# Patient Record
Sex: FEMALE | Race: WHITE | NOT HISPANIC OR LATINO | Employment: FULL TIME | URBAN - METROPOLITAN AREA
[De-identification: names, ages, dates, MRNs, and addresses within clinical notes are randomized per-mention and may not be internally consistent; named-entity substitution may affect disease eponyms.]

---

## 2018-04-23 ENCOUNTER — OFFICE VISIT (OUTPATIENT)
Dept: OBSTETRICS AND GYNECOLOGY | Facility: CLINIC | Age: 58
End: 2018-04-23
Payer: COMMERCIAL

## 2018-04-23 VITALS
SYSTOLIC BLOOD PRESSURE: 102 MMHG | HEIGHT: 60 IN | DIASTOLIC BLOOD PRESSURE: 80 MMHG | WEIGHT: 138.69 LBS | BODY MASS INDEX: 27.23 KG/M2

## 2018-04-23 DIAGNOSIS — Z01.419 ENCOUNTER FOR GYNECOLOGICAL EXAMINATION WITHOUT ABNORMAL FINDING: Primary | ICD-10-CM

## 2018-04-23 PROCEDURE — 88175 CYTOPATH C/V AUTO FLUID REDO: CPT

## 2018-04-23 PROCEDURE — 99386 PREV VISIT NEW AGE 40-64: CPT | Mod: S$GLB,,, | Performed by: OBSTETRICS & GYNECOLOGY

## 2018-04-23 PROCEDURE — 99999 PR PBB SHADOW E&M-NEW PATIENT-LVL III: CPT | Mod: PBBFAC,,, | Performed by: OBSTETRICS & GYNECOLOGY

## 2018-04-23 NOTE — PROGRESS NOTES
PT HERE FOR ANNUAL.  NO PROBLEMS.    ROS:  GENERAL: No fever, chills, fatigability or weight loss.  VULVAR: No pain, no lesions and no itching.  VAGINAL: No relaxation, no itching, no discharge, no abnormal bleeding and no lesions.  ABDOMEN: No abdominal pain. Denies nausea. Denies vomiting. No diarrhea. No constipation  BREAST: Denies pain. No lumps. No discharge.  URINARY: No incontinence, no nocturia, no frequency and no dysuria.  CARDIOVASCULAR: No chest pain. No shortness of breath. No leg cramps.  NEUROLOGICAL: No headaches. No vision changes.  The remainder of the review of systems was negative.    PE:  General Appearance: overweight And Well developed. Well nourished. In no acute distress.  Vulva: Lesions: No.  Urethral Meatus: Normal size. Normal location. No lesions. No prolapse.  Urethra: No masses. No tenderness. No prolapse. No scarring.  Bladder: No masses. No tenderness.  Vagina: Mucosa NI:yes discharge no, atrophy no, cystocele no or rectocele no.  Cervix: Lesion: no  Stenotic: no Cervical motion tenderness: no  Uterus: Uterus size: 6 weeks. Support good. Uterus size: Normal  Adnexa: Masses: No Tenderness: No CDS Nodularity: No  Abdomen: overweight No masses. No tenderness.  Breasts: No bilateral masses. No bilateral discharge. No bilateral tenderness. No bilateral fibrocystic changes.  Neck: No thyroid enlargement. No thyroid masses.  Skin: Rashes: No    PROCEDURES:    PLAN:     DIAGNOSIS:  1. Encounter for gynecological examination without abnormal finding        MEDICATIONS & ORDERS:  Orders Placed This Encounter    Mammo Digital Screening Bilat without CA    Liquid-based pap smear, screening     Patient was counseled today on the new ACS guidelines for cervical cytology screening as well as the current recommendations for breast cancer screening. She was counseled to follow up with her PCP for other routine health maintenance. Counseling session lasted approximately 10 minutes, and all her  questions were answered.     FOLLOW-UP: With me in 12 month

## 2018-04-27 ENCOUNTER — TELEPHONE (OUTPATIENT)
Dept: OBSTETRICS AND GYNECOLOGY | Facility: CLINIC | Age: 58
End: 2018-04-27

## 2018-04-27 DIAGNOSIS — R92.8 ABNORMAL MAMMOGRAM: Primary | ICD-10-CM

## 2018-04-27 NOTE — TELEPHONE ENCOUNTER
No answer. Left message.     Graciela this is Araceli calling from the [facility]. I was calling to to notify you of results from your recent visit at the clinic. Please call the office at your convenience.

## 2018-04-27 NOTE — TELEPHONE ENCOUNTER
----- Message from Jamir Mcadams Jr., MD sent at 4/27/2018  1:05 PM CDT -----  Negative for intraepithelial lesion or malignancy.

## 2018-04-27 NOTE — TELEPHONE ENCOUNTER
----- Message from Jamir Mcadams Jr., MD sent at 4/27/2018 11:38 AM CDT -----  CALL PT. NEEDS DX MMG AT [facility]

## 2018-04-30 ENCOUNTER — TELEPHONE (OUTPATIENT)
Dept: OBSTETRICS AND GYNECOLOGY | Facility: CLINIC | Age: 58
End: 2018-04-30

## 2018-04-30 NOTE — TELEPHONE ENCOUNTER
Spoke with pt, informed her of normal pap results and that dr dela cruz wants a diagnostic mammo done at [facility]. Pt verbalized understanding. appt scheduled for diagnostic mammo. No further questions or concerns noted.

## 2018-04-30 NOTE — TELEPHONE ENCOUNTER
----- Message from Krystina Gates LPN sent at 4/30/2018  8:50 AM CDT -----  Contact: WILLIE BRAVO [[id number]]      ----- Message -----  From: Norman Zendejas  Sent: 4/30/2018   8:36 AM  To: Abbe PATEL Jr Staff    Name of Who is Calling: WILLIE BRAVO [[id number]]      What is the request in detail: Returning a call      Can the clinic reply by [facility]: no      What Number to Call Back if not in [facility]: [phone]

## 2018-05-08 ENCOUNTER — HOSPITAL ENCOUNTER (OUTPATIENT)
Dept: RADIOLOGY | Facility: HOSPITAL | Age: 58
Discharge: HOME | End: 2018-05-08
Attending: OBSTETRICS & GYNECOLOGY
Payer: COMMERCIAL

## 2018-05-08 VITALS — WEIGHT: 138 LBS | HEIGHT: 60 IN | BODY MASS INDEX: 27.09 KG/M2

## 2018-05-08 DIAGNOSIS — R92.8 ABNORMAL MAMMOGRAM: ICD-10-CM

## 2018-05-08 PROCEDURE — 77065 DX MAMMO INCL CAD UNI: CPT | Mod: 26,,, | Performed by: RADIOLOGY

## 2018-05-08 PROCEDURE — 77065 DX MAMMO INCL CAD UNI: CPT | Mod: TC,PO

## 2018-05-08 PROCEDURE — 77061 BREAST TOMOSYNTHESIS UNI: CPT | Mod: 26,,, | Performed by: RADIOLOGY

## 2018-05-08 PROCEDURE — 77061 BREAST TOMOSYNTHESIS UNI: CPT | Mod: TC,PO

## 2019-04-12 ENCOUNTER — NURSE TRIAGE (OUTPATIENT)
Dept: ADMINISTRATIVE | Facility: CLINIC | Age: 59
End: 2019-04-12

## 2019-04-12 NOTE — TELEPHONE ENCOUNTER
Spoke with patient advised her that per Dr. Espinal he would like her to go to the ER since we can not do any proper testing that she would need in clinic. States she just doesn't understand why she needs to go to the ER. Advised patient that Dr. Espinal wants her to go to get some testing done as to why she is having face numbness and headaches. States she will go when she gets off of work at 3:15pm. Advised patient she should go to the ER now. Patient says she knows she is not having a stroke since she had an x-ray at the dentist yesterday and she will go to the ER when she gets off. Nothing further discussed

## 2019-04-12 NOTE — TELEPHONE ENCOUNTER
"    Reason for Disposition   New neurologic deficit that is present NOW, sudden onset of ANY of the following: * Weakness of the face, arm, or leg on one side of the body * Numbness of the face, arm, or leg on one side of the body * Loss of speech or garbled speech    Protocols used: NEUROLOGIC DEFICIT-A-OH    Annamarie states she woke 3 days ago with sudden numbness on one side of her face, still present.  She went to the dentist to see if it was a tooth, but received a thorough dental exam, nothing found, and DDS recommended she seek medical care.  She states she still has the numbness, it has not progressed, has not changed.  Additionally states that yesterday "I developed a raging severe headache".  Took ibuprofen; she said is better. Per [facility] tri[facility] protocol, recommended immediate medical attention.  She states the  made her an appointment with Dr Nathaniel Espinal, her pcp, for 1715 this evening.  Explained that these two symptoms are indications she needs to be seen now, evaluated, and not in the clinic at 5:15 on a Friday night.  No stat testing can be done in clinic.  She states she will call Dr Espinal.  Message to Dr Espinal. Please contact caller directly with any additional care advice.    "

## 2019-05-22 DIAGNOSIS — Z12.39 SCREENING BREAST EXAMINATION: Primary | ICD-10-CM

## 2019-06-03 ENCOUNTER — TELEPHONE (OUTPATIENT)
Dept: OBSTETRICS AND GYNECOLOGY | Facility: CLINIC | Age: 59
End: 2019-06-03

## 2019-06-03 ENCOUNTER — OFFICE VISIT (OUTPATIENT)
Dept: OBSTETRICS AND GYNECOLOGY | Facility: CLINIC | Age: 59
End: 2019-06-03
Payer: COMMERCIAL

## 2019-06-03 VITALS
BODY MASS INDEX: 27.77 KG/M2 | HEIGHT: 60 IN | DIASTOLIC BLOOD PRESSURE: 66 MMHG | WEIGHT: 141.44 LBS | SYSTOLIC BLOOD PRESSURE: 116 MMHG

## 2019-06-03 DIAGNOSIS — N83.202 CYST OF LEFT OVARY: Primary | ICD-10-CM

## 2019-06-03 PROCEDURE — 99396 PREV VISIT EST AGE 40-64: CPT | Mod: S$GLB,,, | Performed by: OBSTETRICS & GYNECOLOGY

## 2019-06-03 PROCEDURE — 99999 PR PBB SHADOW E&M-EST. PATIENT-LVL III: ICD-10-PCS | Mod: PBBFAC,,, | Performed by: OBSTETRICS & GYNECOLOGY

## 2019-06-03 PROCEDURE — 99999 PR PBB SHADOW E&M-EST. PATIENT-LVL III: CPT | Mod: PBBFAC,,, | Performed by: OBSTETRICS & GYNECOLOGY

## 2019-06-03 PROCEDURE — 99396 PR PREVENTIVE VISIT,EST,40-64: ICD-10-PCS | Mod: S$GLB,,, | Performed by: OBSTETRICS & GYNECOLOGY

## 2019-06-03 RX ORDER — IBUPROFEN 800 MG/1
TABLET ORAL
Refills: 0 | COMMUNITY
Start: 2019-05-16 | End: 2022-08-02

## 2019-06-03 NOTE — TELEPHONE ENCOUNTER
----- Message from Jordi Gutierrez MD sent at 6/3/2019  3:30 PM CDT -----  Imaging reviewed. Normal mammogram. Please notify patient.

## 2019-06-03 NOTE — PROGRESS NOTES
History & Physical  Gynecology      SUBJECTIVE:     Chief Complaint: Gynecologic Exam       History of Present Illness:  Ms. Jiménez is a 59 yr old female who presents for annual, well woman exam. Complaints: None. Postmenonpausal. Denies vaginal bleeding. No hx abnormal paps. Last pap was 2018. Currently sexually active. No hx of STIs. Denies fam hx of breast, ovarian or colon cancer. Feels safe at home, wears seatbelts, exercises intermittently.        Review of patient's allergies indicates:   Allergen Reactions    Azithromycin Anxiety    Codeine Nausea And Vomiting       History reviewed. No pertinent past medical history.  Past Surgical History:   Procedure Laterality Date    BREAST CYST EXCISION Left     BREAST MASS EXCISION Left 1996     SECTION      X 2    ESOPHAGEAL ATRESIA REPAIR  1965     OB History        4    Para   2    Term   2            AB   2    Living           SAB   2    TAB        Ectopic        Multiple        Live Births                   Family History   Problem Relation Age of Onset    Colon cancer Maternal Grandfather     Ovarian cancer Neg Hx     Breast cancer Neg Hx      Social History     Tobacco Use    Smoking status: Never Smoker   Substance Use Topics    Alcohol use: No    Drug use: No       Current Outpatient Medications   Medication Sig    ibuprofen (ADVIL,MOTRIN) 800 MG tablet      No current facility-administered medications for this visit.          Review of Systems:  Review of Systems   Constitutional: Negative for activity change, fatigue, fever and unexpected weight change.   Respiratory: Negative for cough and shortness of breath.    Cardiovascular: Negative for chest pain and palpitations.   Gastrointestinal: Negative for abdominal pain, constipation, diarrhea and nausea.   Endocrine: Negative for hot flashes.   Genitourinary: Negative for dyspareunia, dysuria, menorrhagia, menstrual problem, pelvic pain and vaginal discharge.    Musculoskeletal: Negative for back pain.   Integumentary:  Negative for nipple discharge.   Neurological: Negative for headaches.   Psychiatric/Behavioral: The patient is not nervous/anxious.    Breast: Negative for nipple discharge       OBJECTIVE:     Physical Exam:  Physical Exam   Constitutional: She is oriented to person, place, and time. She appears well-developed and well-nourished.   HENT:   Head: Normocephalic and atraumatic.   Neck: Normal range of motion. Neck supple.   Cardiovascular: Normal rate, regular rhythm, normal heart sounds and intact distal pulses.   Pulmonary/Chest: Effort normal and breath sounds normal. Right breast exhibits no inverted nipple, no mass, no nipple discharge, no skin change and no tenderness. Left breast exhibits no inverted nipple, no mass, no nipple discharge, no skin change and no tenderness.   Abdominal: Soft. Bowel sounds are normal. There is no tenderness. There is no guarding.   Genitourinary: There is no rash, tenderness, lesion or injury on the right labia. There is no rash, tenderness, lesion or injury on the left labia. Uterus is not deviated, not enlarged, not fixed and not tender. Cervix exhibits no motion tenderness, no discharge and no friability. Right adnexum displays no mass, no tenderness and no fullness. Left adnexum displays mass. Left adnexum displays no tenderness and no fullness. No erythema, tenderness or bleeding in the vagina. No foreign body in the vagina. No signs of injury around the vagina. No vaginal discharge found.   Neurological: She is alert and oriented to person, place, and time.   Skin: Skin is warm and dry.   Psychiatric: She has a normal mood and affect.   Vitals reviewed.        ASSESSMENT:       ICD-10-CM ICD-9-CM    1. Cyst of left ovary N83.202 620.2 US Pelvis Comp with Transvag NON-OB (xpd          Plan:      Annual, well woman done today. No hx of abnormal paps. Last pap 2018. Needs repeat pap in 2 years  Mammogram today  Has  never had colonoscopy. Discussed screening recommendations with patient. Patient currently declines  Some fullness of left adnexa. May be stool in bowel. Well circumscribed and mobile. Will evaluate with TVUS  RTC in 1 yr for annual exam or PRN    Counseling time: 15 minutes    Jordi Gtuierrez

## 2019-06-18 ENCOUNTER — TELEPHONE (OUTPATIENT)
Dept: OBSTETRICS AND GYNECOLOGY | Facility: CLINIC | Age: 59
End: 2019-06-18

## 2019-06-18 NOTE — TELEPHONE ENCOUNTER
----- Message from Nabila Hagen MA sent at 6/18/2019  2:30 PM CDT -----  Contact: Self  Pt is calling to get ultrasound results.  The pt can be reached at [phone].  Thanks FL

## 2020-07-16 ENCOUNTER — OFFICE VISIT (OUTPATIENT)
Dept: OBSTETRICS AND GYNECOLOGY | Facility: CLINIC | Age: 60
End: 2020-07-16
Payer: COMMERCIAL

## 2020-07-16 VITALS
BODY MASS INDEX: 27.36 KG/M2 | DIASTOLIC BLOOD PRESSURE: 70 MMHG | WEIGHT: 140.13 LBS | SYSTOLIC BLOOD PRESSURE: 124 MMHG

## 2020-07-16 DIAGNOSIS — Z12.31 ENCOUNTER FOR SCREENING MAMMOGRAM FOR MALIGNANT NEOPLASM OF BREAST: ICD-10-CM

## 2020-07-16 DIAGNOSIS — Z01.419 ENCOUNTER FOR WELL WOMAN EXAM WITH ROUTINE GYNECOLOGICAL EXAM: Primary | ICD-10-CM

## 2020-07-16 DIAGNOSIS — Z12.11 SCREENING FOR COLON CANCER: ICD-10-CM

## 2020-07-16 PROCEDURE — 3008F PR BODY MASS INDEX (BMI) DOCUMENTED: ICD-10-PCS | Mod: CPTII,S$GLB,, | Performed by: NURSE PRACTITIONER

## 2020-07-16 PROCEDURE — 99999 PR PBB SHADOW E&M-EST. PATIENT-LVL III: ICD-10-PCS | Mod: PBBFAC,,, | Performed by: NURSE PRACTITIONER

## 2020-07-16 PROCEDURE — 3008F BODY MASS INDEX DOCD: CPT | Mod: CPTII,S$GLB,, | Performed by: NURSE PRACTITIONER

## 2020-07-16 PROCEDURE — 99396 PR PREVENTIVE VISIT,EST,40-64: ICD-10-PCS | Mod: S$GLB,,, | Performed by: NURSE PRACTITIONER

## 2020-07-16 PROCEDURE — 99999 PR PBB SHADOW E&M-EST. PATIENT-LVL III: CPT | Mod: PBBFAC,,, | Performed by: NURSE PRACTITIONER

## 2020-07-16 PROCEDURE — 99396 PREV VISIT EST AGE 40-64: CPT | Mod: S$GLB,,, | Performed by: NURSE PRACTITIONER

## 2020-07-16 NOTE — PROGRESS NOTES
Chief Complaint: Well Woman Exam     HPI:      Annamarie Jiménez is a 60 y.o.  who presents today for well woman exam. Patient is postmenopausal for 18 years. Denies PMB. Patient denies abnormal vaginal bleeding, discharge, pelvic pain, urinary problems, or changes in appetite. Ms. Jiménez is currently sexually active with a single male partner. She declines STD screening today. Ms. Jiménez confirms that she is safe at home.    Previous Pap:  NILM (2018) Denies hx of abnormal paps  Previous Mammogram: BiRads: 1 T-C Score: 8.06% (2019)   Most Recent Dexa: not indicated  Colonoscopy: Never had.     Family History   Problem Relation Age of Onset    Colon cancer Maternal Grandfather     Ovarian cancer Neg Hx     Breast cancer Neg Hx      OB History        4    Para   2    Term   2            AB   2    Living           SAB   2    TAB        Ectopic        Multiple        Live Births                     ROS:     GENERAL: Denies unintentional weight gain or weight loss. Feeling well overall.   SKIN: Denies rash or lesions.   HEENT: Denies headaches, or vision changes.   CARDIOVASCULAR: Denies palpitations or chest pain.   RESPIRATORY: Denies shortness of breath or dyspnea on exertion.  BREASTS: Denies pain, lumps, or nipple discharge.   ABDOMEN: Denies abdominal pain, constipation, diarrhea, nausea, vomiting, change in appetite.  URINARY: Denies frequency, dysuria, hematuria.  NEUROLOGIC: Denies syncope or weakness.   PSYCHIATRIC: Denies depression, anxiety or mood swings.    Physical Exam:      PHYSICAL EXAM:  /70   Wt 63.6 kg (140 lb 1.6 oz)   BMI 27.36 kg/m²   Body mass index is 27.36 kg/m².     APPEARANCE: Well nourished, well developed, in no acute distress.  PSYCH: Appropriate mood and affect.  SKIN: No acne or hirsutism  NECK: Neck symmetric without masses or thyromegaly  NODES: No inguinal, axillary, or supraclavicular lymph node enlargement  ABDOMEN: Soft.  No tenderness  or masses.    CARDIOVASCULAR: No edema of peripheral extremities  BREASTS: Symmetrical, no skin changes or visible lesions.  No palpable masses or nipple discharge bilaterally.  PELVIC: Normal external genitalia without lesions.  Normal hair distribution.  Adequate perineal body, normal urethral meatus.  Vagina atrophic without lesions or discharge.  Cervix pink, without lesions, discharge or tenderness.  No significant cystocele or rectocele.  Bimanual exam shows uterus to be normal size, regular, mobile and nontender.  Adnexa without masses or tenderness.      Assessment/Plan:     Encounter for well woman exam with routine gynecological exam    Encounter for screening mammogram for malignant neoplasm of breast  -     Mammo Digital Screening Bilat w/ Ho; Future; Expected date: 07/16/2020    Screening for colon cancer      Counseling:     Patient was counseled today on current ASCCP pap guidelines, the recommendation for yearly pelvic exams, healthy diet and exercise routines, breast self awareness and annual mammograms.She is to see her PCP for other health maintenance.     Use of the [facility] Patient Portal discussed and encouraged during today's visit.

## 2020-07-29 ENCOUNTER — HOSPITAL ENCOUNTER (OUTPATIENT)
Dept: RADIOLOGY | Facility: OTHER | Age: 60
Discharge: HOME | End: 2020-07-29
Attending: NURSE PRACTITIONER
Payer: COMMERCIAL

## 2020-07-29 DIAGNOSIS — Z12.31 ENCOUNTER FOR SCREENING MAMMOGRAM FOR MALIGNANT NEOPLASM OF BREAST: ICD-10-CM

## 2020-07-29 PROCEDURE — 77063 BREAST TOMOSYNTHESIS BI: CPT | Mod: 26,,, | Performed by: RADIOLOGY

## 2020-07-29 PROCEDURE — 77063 MAMMO DIGITAL SCREENING BILAT WITH TOMOSYNTHESIS_CAD: ICD-10-PCS | Mod: 26,,, | Performed by: RADIOLOGY

## 2020-07-29 PROCEDURE — 77067 MAMMO DIGITAL SCREENING BILAT WITH TOMOSYNTHESIS_CAD: ICD-10-PCS | Mod: 26,,, | Performed by: RADIOLOGY

## 2020-07-29 PROCEDURE — 77067 SCR MAMMO BI INCL CAD: CPT | Mod: 26,,, | Performed by: RADIOLOGY

## 2020-07-29 PROCEDURE — 77067 SCR MAMMO BI INCL CAD: CPT | Mod: TC

## 2020-08-03 ENCOUNTER — TELEPHONE (OUTPATIENT)
Dept: OBSTETRICS AND GYNECOLOGY | Facility: CLINIC | Age: 60
End: 2020-08-03

## 2021-01-14 ENCOUNTER — IMMUNIZATION (OUTPATIENT)
Dept: PRIMARY CARE CLINIC | Facility: CLINIC | Age: 61
End: 2021-01-14
Payer: COMMERCIAL

## 2021-01-14 DIAGNOSIS — Z23 NEED FOR VACCINATION: ICD-10-CM

## 2021-01-14 PROCEDURE — 91300 COVID-19, MRNA, LNP-S, PF, 30 MCG/0.3 ML DOSE VACCINE: CPT | Mod: PBBFAC | Performed by: EMERGENCY MEDICINE

## 2021-02-04 ENCOUNTER — IMMUNIZATION (OUTPATIENT)
Dept: PRIMARY CARE CLINIC | Facility: CLINIC | Age: 61
End: 2021-02-04
Payer: COMMERCIAL

## 2021-02-04 DIAGNOSIS — Z23 NEED FOR VACCINATION: Primary | ICD-10-CM

## 2021-02-04 PROCEDURE — 91300 COVID-19, MRNA, LNP-S, PF, 30 MCG/0.3 ML DOSE VACCINE: CPT | Mod: PBBFAC | Performed by: EMERGENCY MEDICINE

## 2021-02-04 PROCEDURE — 0002A COVID-19, MRNA, LNP-S, PF, 30 MCG/0.3 ML DOSE VACCINE: CPT | Mod: PBBFAC | Performed by: EMERGENCY MEDICINE

## 2021-08-19 ENCOUNTER — OFFICE VISIT (OUTPATIENT)
Dept: OBSTETRICS AND GYNECOLOGY | Facility: CLINIC | Age: 61
End: 2021-08-19
Payer: COMMERCIAL

## 2021-08-19 VITALS
WEIGHT: 142.75 LBS | DIASTOLIC BLOOD PRESSURE: 68 MMHG | SYSTOLIC BLOOD PRESSURE: 122 MMHG | BODY MASS INDEX: 27.88 KG/M2

## 2021-08-19 DIAGNOSIS — Z01.419 ENCOUNTER FOR WELL WOMAN EXAM WITH ROUTINE GYNECOLOGICAL EXAM: Primary | ICD-10-CM

## 2021-08-19 DIAGNOSIS — Z12.31 ENCOUNTER FOR SCREENING MAMMOGRAM FOR MALIGNANT NEOPLASM OF BREAST: ICD-10-CM

## 2021-08-19 PROCEDURE — 1160F PR REVIEW ALL MEDS BY PRESCRIBER/CLIN PHARMACIST DOCUMENTED: ICD-10-PCS | Mod: CPTII,S$GLB,, | Performed by: NURSE PRACTITIONER

## 2021-08-19 PROCEDURE — 88175 CYTOPATH C/V AUTO FLUID REDO: CPT | Performed by: NURSE PRACTITIONER

## 2021-08-19 PROCEDURE — 3008F BODY MASS INDEX DOCD: CPT | Mod: CPTII,S$GLB,, | Performed by: NURSE PRACTITIONER

## 2021-08-19 PROCEDURE — 3078F PR MOST RECENT DIASTOLIC BLOOD PRESSURE < 80 MM HG: ICD-10-PCS | Mod: CPTII,S$GLB,, | Performed by: NURSE PRACTITIONER

## 2021-08-19 PROCEDURE — 3078F DIAST BP <80 MM HG: CPT | Mod: CPTII,S$GLB,, | Performed by: NURSE PRACTITIONER

## 2021-08-19 PROCEDURE — 1159F MED LIST DOCD IN RCRD: CPT | Mod: CPTII,S$GLB,, | Performed by: NURSE PRACTITIONER

## 2021-08-19 PROCEDURE — 99999 PR PBB SHADOW E&M-EST. PATIENT-LVL III: ICD-10-PCS | Mod: PBBFAC,,, | Performed by: NURSE PRACTITIONER

## 2021-08-19 PROCEDURE — 99396 PR PREVENTIVE VISIT,EST,40-64: ICD-10-PCS | Mod: S$GLB,,, | Performed by: NURSE PRACTITIONER

## 2021-08-19 PROCEDURE — 87624 HPV HI-RISK TYP POOLED RSLT: CPT | Performed by: NURSE PRACTITIONER

## 2021-08-19 PROCEDURE — 1159F PR MEDICATION LIST DOCUMENTED IN MEDICAL RECORD: ICD-10-PCS | Mod: CPTII,S$GLB,, | Performed by: NURSE PRACTITIONER

## 2021-08-19 PROCEDURE — 3074F PR MOST RECENT SYSTOLIC BLOOD PRESSURE < 130 MM HG: ICD-10-PCS | Mod: CPTII,S$GLB,, | Performed by: NURSE PRACTITIONER

## 2021-08-19 PROCEDURE — 1126F PR PAIN SEVERITY QUANTIFIED, NO PAIN PRESENT: ICD-10-PCS | Mod: CPTII,S$GLB,, | Performed by: NURSE PRACTITIONER

## 2021-08-19 PROCEDURE — 3074F SYST BP LT 130 MM HG: CPT | Mod: CPTII,S$GLB,, | Performed by: NURSE PRACTITIONER

## 2021-08-19 PROCEDURE — 1160F RVW MEDS BY RX/DR IN RCRD: CPT | Mod: CPTII,S$GLB,, | Performed by: NURSE PRACTITIONER

## 2021-08-19 PROCEDURE — 99999 PR PBB SHADOW E&M-EST. PATIENT-LVL III: CPT | Mod: PBBFAC,,, | Performed by: NURSE PRACTITIONER

## 2021-08-19 PROCEDURE — 1126F AMNT PAIN NOTED NONE PRSNT: CPT | Mod: CPTII,S$GLB,, | Performed by: NURSE PRACTITIONER

## 2021-08-19 PROCEDURE — 3008F PR BODY MASS INDEX (BMI) DOCUMENTED: ICD-10-PCS | Mod: CPTII,S$GLB,, | Performed by: NURSE PRACTITIONER

## 2021-08-19 PROCEDURE — 99396 PREV VISIT EST AGE 40-64: CPT | Mod: S$GLB,,, | Performed by: NURSE PRACTITIONER

## 2021-08-20 ENCOUNTER — HOSPITAL ENCOUNTER (OUTPATIENT)
Dept: RADIOLOGY | Facility: OTHER | Age: 61
Discharge: HOME | End: 2021-08-20
Attending: NURSE PRACTITIONER
Payer: COMMERCIAL

## 2021-08-20 DIAGNOSIS — Z12.31 ENCOUNTER FOR SCREENING MAMMOGRAM FOR MALIGNANT NEOPLASM OF BREAST: ICD-10-CM

## 2021-08-20 PROCEDURE — 77067 SCR MAMMO BI INCL CAD: CPT | Mod: 26,,, | Performed by: RADIOLOGY

## 2021-08-20 PROCEDURE — 77063 BREAST TOMOSYNTHESIS BI: CPT | Mod: 26,,, | Performed by: RADIOLOGY

## 2021-08-20 PROCEDURE — 77067 SCR MAMMO BI INCL CAD: CPT | Mod: TC

## 2021-08-20 PROCEDURE — 77063 MAMMO DIGITAL SCREENING BILAT WITH TOMO: ICD-10-PCS | Mod: 26,,, | Performed by: RADIOLOGY

## 2021-08-20 PROCEDURE — 77067 MAMMO DIGITAL SCREENING BILAT WITH TOMO: ICD-10-PCS | Mod: 26,,, | Performed by: RADIOLOGY

## 2021-08-24 ENCOUNTER — TELEPHONE (OUTPATIENT)
Dept: OBSTETRICS AND GYNECOLOGY | Facility: CLINIC | Age: 61
End: 2021-08-24

## 2021-08-26 LAB
HPV HR 12 DNA SPEC QL NAA+PROBE: NEGATIVE
HPV16 AG SPEC QL: NEGATIVE
HPV18 DNA SPEC QL NAA+PROBE: NEGATIVE

## 2021-08-27 ENCOUNTER — TELEPHONE (OUTPATIENT)
Dept: OBSTETRICS AND GYNECOLOGY | Facility: CLINIC | Age: 61
End: 2021-08-27

## 2021-09-10 LAB
FINAL PATHOLOGIC DIAGNOSIS: NORMAL
Lab: NORMAL

## 2021-09-14 ENCOUNTER — TELEPHONE (OUTPATIENT)
Dept: OBSTETRICS AND GYNECOLOGY | Facility: CLINIC | Age: 61
End: 2021-09-14

## 2022-07-29 ENCOUNTER — PATIENT OUTREACH (OUTPATIENT)
Dept: ADMINISTRATIVE | Facility: HOSPITAL | Age: 62
End: 2022-07-29
Payer: COMMERCIAL

## 2022-08-02 ENCOUNTER — OFFICE VISIT (OUTPATIENT)
Dept: PRIMARY CARE CLINIC | Facility: CLINIC | Age: 62
End: 2022-08-02
Payer: COMMERCIAL

## 2022-08-02 VITALS
BODY MASS INDEX: 27.83 KG/M2 | HEART RATE: 68 BPM | SYSTOLIC BLOOD PRESSURE: 118 MMHG | OXYGEN SATURATION: 97 % | RESPIRATION RATE: 18 BRPM | WEIGHT: 141.75 LBS | HEIGHT: 60 IN | DIASTOLIC BLOOD PRESSURE: 72 MMHG

## 2022-08-02 DIAGNOSIS — Z11.4 ENCOUNTER FOR SCREENING FOR HIV: ICD-10-CM

## 2022-08-02 DIAGNOSIS — M15.1 OSTEOARTHRITIS OF DISTAL INTERPHALANGEAL (DIP) JOINT OF LEFT INDEX FINGER: ICD-10-CM

## 2022-08-02 DIAGNOSIS — Z00.00 HEALTH CARE MAINTENANCE: ICD-10-CM

## 2022-08-02 DIAGNOSIS — Z11.59 NEED FOR HEPATITIS C SCREENING TEST: ICD-10-CM

## 2022-08-02 DIAGNOSIS — J45.20 MILD INTERMITTENT ASTHMA WITHOUT COMPLICATION: ICD-10-CM

## 2022-08-02 DIAGNOSIS — Z87.01 HISTORY OF BACTERIAL PNEUMONIA: ICD-10-CM

## 2022-08-02 DIAGNOSIS — J40 BRONCHITIS: ICD-10-CM

## 2022-08-02 DIAGNOSIS — Z12.11 SPECIAL SCREENING FOR MALIGNANT NEOPLASM OF COLON: Primary | ICD-10-CM

## 2022-08-02 DIAGNOSIS — Q39.0 ESOPHAGEAL ATRESIA: ICD-10-CM

## 2022-08-02 PROCEDURE — 3074F PR MOST RECENT SYSTOLIC BLOOD PRESSURE < 130 MM HG: ICD-10-PCS | Mod: CPTII,S$GLB,, | Performed by: FAMILY MEDICINE

## 2022-08-02 PROCEDURE — 3074F SYST BP LT 130 MM HG: CPT | Mod: CPTII,S$GLB,, | Performed by: FAMILY MEDICINE

## 2022-08-02 PROCEDURE — 1159F MED LIST DOCD IN RCRD: CPT | Mod: CPTII,S$GLB,, | Performed by: FAMILY MEDICINE

## 2022-08-02 PROCEDURE — 3078F DIAST BP <80 MM HG: CPT | Mod: CPTII,S$GLB,, | Performed by: FAMILY MEDICINE

## 2022-08-02 PROCEDURE — 99999 PR PBB SHADOW E&M-EST. PATIENT-LVL III: ICD-10-PCS | Mod: PBBFAC,,, | Performed by: FAMILY MEDICINE

## 2022-08-02 PROCEDURE — 1159F PR MEDICATION LIST DOCUMENTED IN MEDICAL RECORD: ICD-10-PCS | Mod: CPTII,S$GLB,, | Performed by: FAMILY MEDICINE

## 2022-08-02 PROCEDURE — 99999 PR PBB SHADOW E&M-EST. PATIENT-LVL III: CPT | Mod: PBBFAC,,, | Performed by: FAMILY MEDICINE

## 2022-08-02 PROCEDURE — 99214 PR OFFICE/OUTPT VISIT, EST, LEVL IV, 30-39 MIN: ICD-10-PCS | Mod: S$GLB,,, | Performed by: FAMILY MEDICINE

## 2022-08-02 PROCEDURE — 3078F PR MOST RECENT DIASTOLIC BLOOD PRESSURE < 80 MM HG: ICD-10-PCS | Mod: CPTII,S$GLB,, | Performed by: FAMILY MEDICINE

## 2022-08-02 PROCEDURE — 99214 OFFICE O/P EST MOD 30 MIN: CPT | Mod: S$GLB,,, | Performed by: FAMILY MEDICINE

## 2022-08-02 PROCEDURE — 3008F PR BODY MASS INDEX (BMI) DOCUMENTED: ICD-10-PCS | Mod: CPTII,S$GLB,, | Performed by: FAMILY MEDICINE

## 2022-08-02 PROCEDURE — 3008F BODY MASS INDEX DOCD: CPT | Mod: CPTII,S$GLB,, | Performed by: FAMILY MEDICINE

## 2022-08-02 RX ORDER — LORAZEPAM 1 MG/1
TABLET ORAL
Qty: 30 TABLET | Refills: 2 | Status: SHIPPED | OUTPATIENT
Start: 2022-08-02

## 2022-08-02 RX ORDER — SERTRALINE HYDROCHLORIDE 25 MG/1
25 TABLET, FILM COATED ORAL DAILY
Qty: 30 TABLET | Refills: 11 | Status: SHIPPED | OUTPATIENT
Start: 2022-08-02 | End: 2023-09-05

## 2022-08-02 RX ORDER — CEFDINIR 300 MG/1
300 CAPSULE ORAL 2 TIMES DAILY
Qty: 20 CAPSULE | Refills: 0 | Status: SHIPPED | OUTPATIENT
Start: 2022-08-02 | End: 2022-08-12
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
## 2022-10-07 ENCOUNTER — PATIENT MESSAGE (OUTPATIENT)
Dept: ADMINISTRATIVE | Facility: HOSPITAL | Age: 62
End: 2022-10-07
Payer: COMMERCIAL

## 2022-10-11 LAB — NONINV COLON CA DNA+OCC BLD SCRN STL QL: NEGATIVE

## 2022-11-10 ENCOUNTER — OFFICE VISIT (OUTPATIENT)
Dept: OBSTETRICS AND GYNECOLOGY | Facility: CLINIC | Age: 62
End: 2022-11-10
Payer: COMMERCIAL

## 2022-11-10 VITALS
SYSTOLIC BLOOD PRESSURE: 122 MMHG | BODY MASS INDEX: 26.97 KG/M2 | DIASTOLIC BLOOD PRESSURE: 76 MMHG | WEIGHT: 138.13 LBS

## 2022-11-10 DIAGNOSIS — Z12.31 ENCOUNTER FOR SCREENING MAMMOGRAM FOR MALIGNANT NEOPLASM OF BREAST: ICD-10-CM

## 2022-11-10 DIAGNOSIS — Z01.419 ENCOUNTER FOR WELL WOMAN EXAM WITH ROUTINE GYNECOLOGICAL EXAM: Primary | ICD-10-CM

## 2022-11-10 PROCEDURE — 3008F PR BODY MASS INDEX (BMI) DOCUMENTED: ICD-10-PCS | Mod: CPTII,S$GLB,, | Performed by: NURSE PRACTITIONER

## 2022-11-10 PROCEDURE — 1159F PR MEDICATION LIST DOCUMENTED IN MEDICAL RECORD: ICD-10-PCS | Mod: CPTII,S$GLB,, | Performed by: NURSE PRACTITIONER

## 2022-11-10 PROCEDURE — 1160F RVW MEDS BY RX/DR IN RCRD: CPT | Mod: CPTII,S$GLB,, | Performed by: NURSE PRACTITIONER

## 2022-11-10 PROCEDURE — 3074F SYST BP LT 130 MM HG: CPT | Mod: CPTII,S$GLB,, | Performed by: NURSE PRACTITIONER

## 2022-11-10 PROCEDURE — 3078F PR MOST RECENT DIASTOLIC BLOOD PRESSURE < 80 MM HG: ICD-10-PCS | Mod: CPTII,S$GLB,, | Performed by: NURSE PRACTITIONER

## 2022-11-10 PROCEDURE — 3074F PR MOST RECENT SYSTOLIC BLOOD PRESSURE < 130 MM HG: ICD-10-PCS | Mod: CPTII,S$GLB,, | Performed by: NURSE PRACTITIONER

## 2022-11-10 PROCEDURE — 1159F MED LIST DOCD IN RCRD: CPT | Mod: CPTII,S$GLB,, | Performed by: NURSE PRACTITIONER

## 2022-11-10 PROCEDURE — 3008F BODY MASS INDEX DOCD: CPT | Mod: CPTII,S$GLB,, | Performed by: NURSE PRACTITIONER

## 2022-11-10 PROCEDURE — 99396 PREV VISIT EST AGE 40-64: CPT | Mod: S$GLB,,, | Performed by: NURSE PRACTITIONER

## 2022-11-10 PROCEDURE — 99999 PR PBB SHADOW E&M-EST. PATIENT-LVL III: ICD-10-PCS | Mod: PBBFAC,,, | Performed by: NURSE PRACTITIONER

## 2022-11-10 PROCEDURE — 3078F DIAST BP <80 MM HG: CPT | Mod: CPTII,S$GLB,, | Performed by: NURSE PRACTITIONER

## 2022-11-10 PROCEDURE — 99396 PR PREVENTIVE VISIT,EST,40-64: ICD-10-PCS | Mod: S$GLB,,, | Performed by: NURSE PRACTITIONER

## 2022-11-10 PROCEDURE — 99999 PR PBB SHADOW E&M-EST. PATIENT-LVL III: CPT | Mod: PBBFAC,,, | Performed by: NURSE PRACTITIONER

## 2022-11-10 PROCEDURE — 1160F PR REVIEW ALL MEDS BY PRESCRIBER/CLIN PHARMACIST DOCUMENTED: ICD-10-PCS | Mod: CPTII,S$GLB,, | Performed by: NURSE PRACTITIONER

## 2022-11-10 NOTE — PROGRESS NOTES
Chief Complaint: Well Woman Exam     HPI:      Annamarie Jiménez is a 62 y.o.  who presents today for well woman exam. Patient is postmenopausal. Denies PMB. Patient denies abnormal vaginal bleeding, discharge, pelvic pain, urinary problems, or changes in appetite. Ms. Jiménez is currently sexually active with a single male partner. She declines STD screening today. Ms. Jiménez confirms that she is safe at home.    Previous Pap:  NILM/ HPV neg (2021) Denies hx of abnormal paps  Previous Mammogram: BiRads: 1 T-C Score: 6.01% (2020)   Colonoscopy: cologuard -negative (10/2022)    Family History   Problem Relation Age of Onset    Colon cancer Maternal Grandfather     Ovarian cancer Neg Hx     Breast cancer Neg Hx      OB History          4    Para   2    Term   2            AB   2    Living             SAB   2    IAB        Ectopic        Multiple        Live Births                     ROS:     GENERAL: Denies unintentional weight gain or weight loss. Feeling well overall.   SKIN: Denies rash or lesions.   BREASTS: Denies pain, lumps, or nipple discharge.   ABDOMEN: Denies abdominal pain, constipation, diarrhea, nausea, vomiting, change in appetite.  URINARY: Denies frequency, dysuria, hematuria.  PSYCHIATRIC: Denies depression, anxiety or mood swings.    Physical Exam:      PHYSICAL EXAM:  /76   Wt 62.6 kg (138 lb 1.9 oz)   BMI 26.97 kg/m²   Body mass index is 26.97 kg/m².     APPEARANCE: Well nourished, well developed, in no acute distress.  PSYCH: Appropriate mood and affect.  ABDOMEN: Soft.  No tenderness or masses.    BREASTS: Symmetrical, no skin changes or visible lesions.  No palpable masses or nipple discharge bilaterally.  PELVIC: Normal external genitalia without lesions.  Normal hair distribution.  Adequate perineal body, normal urethral meatus.  Vagina atrophic without lesions or discharge.  Cervix pink, without lesions, discharge or tenderness.  No significant  cystocele or rectocele.  Bimanual exam shows uterus to be slightly enlarged size, irregular, mobile and nontender.  Adnexa without masses or tenderness.      Assessment/Plan:     Encounter for well woman exam with routine gynecological exam    Encounter for screening mammogram for malignant neoplasm of breast  -     Mammo Digital Screening Bilat w/ Ho; Future; Expected date: 11/10/2022    Counseling:     Patient was counseled today on current ASCCP pap guidelines, the recommendation for yearly pelvic exams, healthy diet and exercise routines, breast self awareness and annual mammograms.She is to see her PCP for other health maintenance.     Use of the [facility] Patient Portal discussed and encouraged during today's visit.

## 2022-11-22 ENCOUNTER — HOSPITAL ENCOUNTER (OUTPATIENT)
Dept: RADIOLOGY | Facility: CLINIC | Age: 62
Discharge: HOME | End: 2022-11-22
Attending: NURSE PRACTITIONER
Payer: COMMERCIAL

## 2022-11-22 DIAGNOSIS — Z12.31 ENCOUNTER FOR SCREENING MAMMOGRAM FOR MALIGNANT NEOPLASM OF BREAST: ICD-10-CM

## 2022-11-22 PROCEDURE — 77067 SCR MAMMO BI INCL CAD: CPT | Mod: TC,PO

## 2022-11-22 PROCEDURE — 77063 BREAST TOMOSYNTHESIS BI: CPT | Mod: TC,PO

## 2022-11-22 PROCEDURE — 77063 BREAST TOMOSYNTHESIS BI: CPT | Mod: 26,,, | Performed by: RADIOLOGY

## 2022-11-22 PROCEDURE — 77067 SCR MAMMO BI INCL CAD: CPT | Mod: 26,,, | Performed by: RADIOLOGY

## 2022-11-22 PROCEDURE — 77067 MAMMO DIGITAL SCREENING BILAT WITH TOMO: ICD-10-PCS | Mod: 26,,, | Performed by: RADIOLOGY

## 2022-11-22 PROCEDURE — 77063 MAMMO DIGITAL SCREENING BILAT WITH TOMO: ICD-10-PCS | Mod: 26,,, | Performed by: RADIOLOGY

## 2022-11-23 ENCOUNTER — TELEPHONE (OUTPATIENT)
Dept: OBSTETRICS AND GYNECOLOGY | Facility: CLINIC | Age: 62
End: 2022-11-23
Payer: COMMERCIAL

## 2022-11-23 NOTE — TELEPHONE ENCOUNTER
----- Message from HOMER Peraza sent at 11/23/2022 11:51 AM CST -----  Please call patient. Normal mammogram. Repeat in 1 year.

## 2023-09-02 NOTE — TELEPHONE ENCOUNTER
Care Due:                  Date            Visit Type   Department     Provider  --------------------------------------------------------------------------------                                EP -                              PRIMARY      [facility]  Last Visit: 08-      CARE ([facility])   PRIMARY CARE   Nathaniel Espinal  Next Visit: None Scheduled  None         None Found                                                            Last  Test          Frequency    Reason                     Performed    Due Date  --------------------------------------------------------------------------------    Office Visit  12 months..  sertraline...............  08- 07-    Health [facility] Embedded Care Due Messages. Reference number: [id number].   9/02/2023 9:21:49 AM JOSEMANUELT

## 2023-09-05 RX ORDER — SERTRALINE HYDROCHLORIDE 25 MG/1
25 TABLET, FILM COATED ORAL DAILY
Qty: 30 TABLET | Refills: 5 | Status: SHIPPED | OUTPATIENT
Start: 2023-09-05 | End: 2024-09-04

## 2023-09-05 RX ORDER — SERTRALINE HYDROCHLORIDE 25 MG/1
25 TABLET, FILM COATED ORAL DAILY
Qty: 30 TABLET | Refills: 2 | Status: SHIPPED | OUTPATIENT
Start: 2023-09-05 | End: 2024-09-04

## 2023-09-05 NOTE — TELEPHONE ENCOUNTER
No care due was identified.  Health [facility] Embedded Care Due Messages. Reference number: [id number].   9/05/2023 12:59:09 PM CDT

## 2023-09-05 NOTE — TELEPHONE ENCOUNTER
----- Message from Dori Vaughn sent at 9/5/2023 12:31 PM CDT -----  Contact: Pt [phone]  Requesting an RX refill or new RX.  Is this a refill or new RX: Refill  RX name and strength (copy/paste from chart):  sertraline (ZOLOFT) 25 MG tablet  Is this a 30 day or 90 day RX: 30  Pharmacy name and phone # (copy/paste from chart):    [facility] - [address]  [address] [place] [id number]  Phone: [phone] Fax: [phone]    Patient is out of medication.    Please call and advise.    Thank You

## 2023-09-18 ENCOUNTER — PATIENT MESSAGE (OUTPATIENT)
Dept: PRIMARY CARE CLINIC | Facility: CLINIC | Age: 63
End: 2023-09-18
Payer: COMMERCIAL

## 2023-10-18 ENCOUNTER — PATIENT MESSAGE (OUTPATIENT)
Dept: CARDIOLOGY | Facility: CLINIC | Age: 63
End: 2023-10-18
Payer: COMMERCIAL

## 2023-12-11 ENCOUNTER — PATIENT MESSAGE (OUTPATIENT)
Dept: ADMINISTRATIVE | Facility: HOSPITAL | Age: 63
End: 2023-12-11
Payer: COMMERCIAL

## 2023-12-12 ENCOUNTER — PATIENT OUTREACH (OUTPATIENT)
Dept: ADMINISTRATIVE | Facility: HOSPITAL | Age: 63
End: 2023-12-12
Payer: COMMERCIAL

## 2023-12-12 DIAGNOSIS — Z12.31 BREAST CANCER SCREENING BY MAMMOGRAM: Primary | ICD-10-CM

## 2023-12-12 NOTE — PROGRESS NOTES
Health Maintenance Due   Topic Date Due    Hemoglobin A1c (Diabetic Prevention Screening)  Never done    Shingles Vaccine (1 of 2) Never done    TETANUS VACCINE  2015    RSV Vaccine (Age 60+ and Pregnant patients) (1 - 1-dose 60+ series) Never done    Influenza Vaccine (1) Never done    COVID-19 Vaccine ( season) 2023    Mammogram  2023     Population Health Chart Review & Patient Outreach Details      Further Action Needed If Patient Returns Outreach:      Called patient to schedule mammogram, no answer. LM on .       Updates Requested / Reviewed:     [x]  Care Everywhere    []     []  External Sources ([facility], [facility], [facility], etc.)    [] [facility]   [] [facility]   [] Other:    [x]  Care Team Updated   []  Removed  or Duplicate Orders   [x]  Immunization Reconciliation Completed / Queried    [x] [place]   [] [place]   [] [place]   [] [place]      Health Maintenance Topics Addressed and Outreach Outcomes / Actions Taken:             Breast Cancer Screening [x]  Mammogram Order Placed    []  Mammogram Screening Scheduled    []  External Records Requested & Care Team Updated if Applicable    []  External Records Uploaded & Care Team Updated if Applicable    []  Pt Declined Scheduling Mammogram    []  Pt Will Schedule with External Provider / Order Routed & Care Team Updated if Applicable              Cervical Cancer Screening []  Pap Smear Scheduled in Primary Care or OBGYN    []  External Records Requested & Care Team Updated if Applicable       []  External Records Uploaded, Care Team Updated, & History Updated if Applicable    []  Patient Declined Scheduling Pap Smear    []  Patient Will Schedule with External Provider & Care Team Updated if Applicable                  Colorectal Cancer Screening []  Colonoscopy Case Request / Referral / Home Test Order Placed    []  External Records Requested & Care Team Updated if Applicable    []  External Records Uploaded, Care Team  Updated, & History Updated if Applicable    []  Patient Declined Completing Colon Cancer Screening    []  Patient Will Schedule with External Provider & Care Team Updated if Applicable    []  Fit Kit Mailed (add the SmartPhrase under additional notes)    []  Reminded Patient to Complete Home Test                Diabetic Eye Exam []  Eye Exam Screening Order Placed    []  Eye Camera Scheduled or Optometry/Ophthalmology Referral Placed    []  External Records Requested & Care Team Updated if Applicable    []  External Records Uploaded, Care Team Updated, & History Updated if Applicable    []  Patient Declined Scheduling Eye Exam    []  Patient Will Schedule with External Provider & Care Team Updated if Applicable             Blood Pressure Control []  Primary Care Follow Up Visit Scheduled     []  Remote Blood Pressure Reading Captured    []  Patient Declined Remote Reading or Scheduling Appt - Escalated to PCP    []  Patient Will Call Back or Send Portal Message with Reading                 HbA1c & Other Labs []  Overdue Lab(s) Ordered    []  Overdue Lab(s) Scheduled    []  External Records Uploaded & Care Team Updated if Applicable    []  Primary Care Follow Up Visit Scheduled     []  Reminded Patient to Complete A1c Home Test    []  Patient Declined Scheduling Labs or Will Call Back to Schedule    []  Patient Will Schedule with External Provider / Order Routed, & Care Team Updated if Applicable           Primary Care Appointment []  Primary Care Appt Scheduled    []  Patient Declined Scheduling or Will Call Back to Schedule    []  Pt Established with External Provider, Updated Care Team, & Informed Pt to Notify Payor if Applicable           Medication Adherence /    Statin Use []  Primary Care Appointment Scheduled    []  Patient Reminded to  Prescription    []  Patient Declined, Provider Notified if Needed    []  Sent Provider Message to Review to Evaluate Pt for Statin, Add Exclusion Dx Codes, Document    Exclusion in Problem List, Change Statin Intensity Level to Moderate or High Intensity if Applicable                Osteoporosis Screening []  Dexa Order Placed    []  Dexa Appointment Scheduled    []  External Records Requested & Care Team Updated    []  External Records Uploaded, Care Team Updated, & History Updated if Applicable    []  Patient Declined Scheduling Dexa or Will Call Back to Schedule    []  Patient Will Schedule with External Provider / Order Routed & Care Team Updated if Applicable       Additional Notes:

## 2023-12-26 ENCOUNTER — HOSPITAL ENCOUNTER (OUTPATIENT)
Dept: RADIOLOGY | Facility: HOSPITAL | Age: 63
Discharge: HOME | End: 2023-12-26
Attending: FAMILY MEDICINE
Payer: COMMERCIAL

## 2023-12-26 DIAGNOSIS — Z12.31 BREAST CANCER SCREENING BY MAMMOGRAM: ICD-10-CM

## 2023-12-26 PROCEDURE — 77067 SCR MAMMO BI INCL CAD: CPT | Mod: 26,,, | Performed by: RADIOLOGY

## 2023-12-26 PROCEDURE — 77063 BREAST TOMOSYNTHESIS BI: CPT | Mod: 26,,, | Performed by: RADIOLOGY

## 2023-12-26 PROCEDURE — 77067 SCR MAMMO BI INCL CAD: CPT | Mod: TC

## 2023-12-26 PROCEDURE — 77067 MAMMO DIGITAL SCREENING BILAT WITH TOMO: ICD-10-PCS | Mod: 26,,, | Performed by: RADIOLOGY

## 2023-12-26 PROCEDURE — 77063 MAMMO DIGITAL SCREENING BILAT WITH TOMO: ICD-10-PCS | Mod: 26,,, | Performed by: RADIOLOGY

## 2024-02-22 ENCOUNTER — OFFICE VISIT (OUTPATIENT)
Dept: OBSTETRICS AND GYNECOLOGY | Facility: CLINIC | Age: 64
End: 2024-02-22
Payer: COMMERCIAL

## 2024-02-22 VITALS
HEART RATE: 63 BPM | DIASTOLIC BLOOD PRESSURE: 57 MMHG | SYSTOLIC BLOOD PRESSURE: 107 MMHG | BODY MASS INDEX: 26.57 KG/M2 | WEIGHT: 136 LBS

## 2024-02-22 DIAGNOSIS — Z01.419 ENCOUNTER FOR WELL WOMAN EXAM WITH ROUTINE GYNECOLOGICAL EXAM: Primary | ICD-10-CM

## 2024-02-22 PROCEDURE — 99396 PREV VISIT EST AGE 40-64: CPT | Mod: S$GLB,,, | Performed by: NURSE PRACTITIONER

## 2024-02-22 PROCEDURE — 1159F MED LIST DOCD IN RCRD: CPT | Mod: CPTII,S$GLB,, | Performed by: NURSE PRACTITIONER

## 2024-02-22 PROCEDURE — 3078F DIAST BP <80 MM HG: CPT | Mod: CPTII,S$GLB,, | Performed by: NURSE PRACTITIONER

## 2024-02-22 PROCEDURE — 1160F RVW MEDS BY RX/DR IN RCRD: CPT | Mod: CPTII,S$GLB,, | Performed by: NURSE PRACTITIONER

## 2024-02-22 PROCEDURE — 3008F BODY MASS INDEX DOCD: CPT | Mod: CPTII,S$GLB,, | Performed by: NURSE PRACTITIONER

## 2024-02-22 PROCEDURE — 3074F SYST BP LT 130 MM HG: CPT | Mod: CPTII,S$GLB,, | Performed by: NURSE PRACTITIONER

## 2024-02-22 PROCEDURE — 99999 PR PBB SHADOW E&M-EST. PATIENT-LVL III: CPT | Mod: PBBFAC,,, | Performed by: NURSE PRACTITIONER

## 2024-02-22 NOTE — PROGRESS NOTES
Chief Complaint: Well Woman Exam     HPI:      Annamarie Jiménez is a 63 y.o.  who presents today for well woman exam. Patient is postmenopausal. Denies PMB. Patient denies abnormal vaginal bleeding, discharge, pelvic pain, urinary problems, or changes in appetite. Ms. Jiménez is currently sexually active with a single male partner. She declines STD screening today. Ms. Jiménez confirms that she is safe at home.    Previous Pap:  NILM/ HPV neg (2021) Denies hx of abnormal paps  Previous Mammogram: BiRads: 1 T-C Score: 8.45% (2023)   Colonoscopy: cologuard -negative (10/2022)    Family History   Problem Relation Age of Onset    Breast cancer Sister     Colon cancer Maternal Grandfather     Ovarian cancer Neg Hx      OB History          4    Para   2    Term   2            AB   2    Living             SAB   2    IAB        Ectopic        Multiple        Live Births                     ROS:     GENERAL: Denies unintentional weight gain or weight loss. Feeling well overall.   SKIN: Denies rash or lesions.   BREASTS: Denies pain, lumps, or nipple discharge.   ABDOMEN: Denies abdominal pain, constipation, diarrhea, nausea, vomiting, change in appetite.  URINARY: Denies frequency, dysuria, hematuria.  PSYCHIATRIC: Denies depression, anxiety or mood swings.    Physical Exam:      PHYSICAL EXAM:  BP (!) 107/57 (BP Location: Right arm, Patient Position: Sitting)   Pulse 63   Wt 61.7 kg (136 lb 0.4 oz)   BMI 26.57 kg/m²   Body mass index is 26.57 kg/m².     APPEARANCE: Well nourished, well developed, in no acute distress.  PSYCH: Appropriate mood and affect.  ABDOMEN: Soft.  No tenderness or masses.    BREASTS: Symmetrical, no skin changes or visible lesions.  No palpable masses or nipple discharge bilaterally.  PELVIC: Normal external genitalia without lesions.  Normal hair distribution.  Adequate perineal body, normal urethral meatus.  Vagina atrophic without lesions or discharge.  Cervix  pink, without lesions, discharge or tenderness.  No significant cystocele or rectocele.  Bimanual exam shows uterus to be slightly enlarged size, irregular, mobile and nontender.  Adnexa without masses or tenderness.      Assessment/Plan:     Encounter for well woman exam with routine gynecological exam      Counseling:     Patient was counseled today on current ASCCP pap guidelines, the recommendation for yearly pelvic exams, healthy diet and exercise routines, breast self awareness and annual mammograms.She is to see her PCP for other health maintenance.

## 2024-04-09 RX ORDER — SERTRALINE HYDROCHLORIDE 25 MG/1
25 TABLET, FILM COATED ORAL DAILY
Qty: 30 TABLET | Refills: 5 | OUTPATIENT
Start: 2024-04-09 | End: 2025-04-09

## 2024-04-09 NOTE — TELEPHONE ENCOUNTER
Care Due:                  Date            Visit Type   Department     Provider  --------------------------------------------------------------------------------                                EP -                              PR[place]      [facility]  Last Visit: 08-      CARE ([facility])   PRIMARY CARE   Nathaniel Espinal  Next Visit: None Scheduled  None         None Found                                                            Last  Test          Frequency    Reason                     Performed    Due Date  --------------------------------------------------------------------------------    Office Visit  15 months..  sertraline...............  08-   10-    [facility] Embedded Care Due Messages. Reference number: [id number].   4/09/2024 9:46:14 AM JOSEMANUELT

## 2024-05-06 ENCOUNTER — OFFICE VISIT (OUTPATIENT)
Dept: PRIMARY CARE CLINIC | Facility: CLINIC | Age: 64
End: 2024-05-06
Payer: COMMERCIAL

## 2024-05-06 VITALS
DIASTOLIC BLOOD PRESSURE: 70 MMHG | HEART RATE: 76 BPM | RESPIRATION RATE: 18 BRPM | BODY MASS INDEX: 26.49 KG/M2 | OXYGEN SATURATION: 95 % | HEIGHT: 60 IN | SYSTOLIC BLOOD PRESSURE: 110 MMHG | WEIGHT: 134.94 LBS

## 2024-05-06 DIAGNOSIS — M15.1 OSTEOARTHRITIS OF DISTAL INTERPHALANGEAL (DIP) JOINT OF LEFT INDEX FINGER: ICD-10-CM

## 2024-05-06 DIAGNOSIS — F32.0 CURRENT MILD EPISODE OF MAJOR DEPRESSIVE DISORDER WITHOUT PRIOR EPISODE: ICD-10-CM

## 2024-05-06 DIAGNOSIS — F41.9 ANXIETY: Primary | ICD-10-CM

## 2024-05-06 PROCEDURE — 3078F DIAST BP <80 MM HG: CPT | Mod: CPTII,S$GLB,, | Performed by: FAMILY MEDICINE

## 2024-05-06 PROCEDURE — 99214 OFFICE O/P EST MOD 30 MIN: CPT | Mod: S$GLB,,, | Performed by: FAMILY MEDICINE

## 2024-05-06 PROCEDURE — 3074F SYST BP LT 130 MM HG: CPT | Mod: CPTII,S$GLB,, | Performed by: FAMILY MEDICINE

## 2024-05-06 PROCEDURE — 1159F MED LIST DOCD IN RCRD: CPT | Mod: CPTII,S$GLB,, | Performed by: FAMILY MEDICINE

## 2024-05-06 PROCEDURE — 99999 PR PBB SHADOW E&M-EST. PATIENT-LVL III: CPT | Mod: PBBFAC,,, | Performed by: FAMILY MEDICINE

## 2024-05-06 PROCEDURE — 3008F BODY MASS INDEX DOCD: CPT | Mod: CPTII,S$GLB,, | Performed by: FAMILY MEDICINE

## 2024-05-06 RX ORDER — LORAZEPAM 1 MG/1
TABLET ORAL
Qty: 30 TABLET | Refills: 2 | Status: SHIPPED | OUTPATIENT
Start: 2024-05-06

## 2024-05-06 RX ORDER — SERTRALINE HYDROCHLORIDE 25 MG/1
25 TABLET, FILM COATED ORAL DAILY
Qty: 90 TABLET | Refills: 3 | Status: SHIPPED | OUTPATIENT
Start: 2024-05-06 | End: 2025-05-06

## 2025-01-28 ENCOUNTER — OFFICE VISIT (OUTPATIENT)
Dept: OBSTETRICS AND GYNECOLOGY | Facility: CLINIC | Age: 65
End: 2025-01-28
Payer: COMMERCIAL

## 2025-01-28 VITALS
DIASTOLIC BLOOD PRESSURE: 72 MMHG | WEIGHT: 129 LBS | BODY MASS INDEX: 25.32 KG/M2 | SYSTOLIC BLOOD PRESSURE: 120 MMHG | HEIGHT: 60 IN

## 2025-01-28 DIAGNOSIS — Z01.419 WELL WOMAN EXAM WITH ROUTINE GYNECOLOGICAL EXAM: Primary | ICD-10-CM

## 2025-01-28 DIAGNOSIS — Z12.4 CERVICAL CANCER SCREENING: ICD-10-CM

## 2025-01-28 PROCEDURE — 88175 CYTOPATH C/V AUTO FLUID REDO: CPT

## 2025-01-28 PROCEDURE — 3078F DIAST BP <80 MM HG: CPT | Mod: CPTII,S$GLB,,

## 2025-01-28 PROCEDURE — 1159F MED LIST DOCD IN RCRD: CPT | Mod: CPTII,S$GLB,,

## 2025-01-28 PROCEDURE — 87624 HPV HI-RISK TYP POOLED RSLT: CPT

## 2025-01-28 PROCEDURE — 99396 PREV VISIT EST AGE 40-64: CPT | Mod: S$GLB,,,

## 2025-01-28 PROCEDURE — 99999 PR PBB SHADOW E&M-EST. PATIENT-LVL III: CPT | Mod: PBBFAC,,,

## 2025-01-28 PROCEDURE — 3008F BODY MASS INDEX DOCD: CPT | Mod: CPTII,S$GLB,,

## 2025-01-28 PROCEDURE — 3074F SYST BP LT 130 MM HG: CPT | Mod: CPTII,S$GLB,,

## 2025-01-28 PROCEDURE — 1160F RVW MEDS BY RX/DR IN RCRD: CPT | Mod: CPTII,S$GLB,,

## 2025-01-28 RX ORDER — ALBUTEROL SULFATE 0.83 MG/ML
SOLUTION RESPIRATORY (INHALATION)
COMMUNITY

## 2025-01-28 RX ORDER — PREDNISONE 10 MG/1
TABLET ORAL
COMMUNITY

## 2025-01-28 RX ORDER — ALBUTEROL SULFATE 90 UG/1
INHALANT RESPIRATORY (INHALATION)
COMMUNITY

## 2025-01-28 NOTE — PROGRESS NOTES
CC: Annual / Well Woman Exam    HPI:   Pt is a 64 y.o.  female who presents for routine annual exam. She does not want STD screening today. She is sexually active with 1 male partner. Denies pain with sex, changes in bowel/bladder habits, or headaches. Denies abnormal vaginal bleeding, vaginal dryness, abnormal vaginal discharge, vaginal itching/irritation, vaginal odor, burning with urination, or urinary frequency. Denies hot flashes, vaginal dryness, night sweats.    LMP: No LMP recorded. Patient is postmenopausal.     FH:   Breast cancer: Sister dx'd in early 40s  Colon cancer: Maternal grandfather   Ovarian cancer: none  Uterine cancer: none    HPV vaccine: Never  COVID vaccine: x3 doses in     Last pap smear:  - NILM / HPV -  History of abnormal pap smears:   Colonoscopy: Never - discuss today  Mammogram:  TC: 8.45 %. Scheduled for next mamm on 2025  STD history: Never  Birth control: postmenopausal  OB history:    Tobacco use: Never    ROS:  GENERAL: Feeling well overall. Denies fever or chills.   SKIN: Denies rash or lesions.   HEAD: Denies head injury or headache.   NODES: Denies enlarged lymph nodes.   CHEST: Denies chest pain or shortness of breath.   CARDIOVASCULAR: Denies palpitations or left sided chest pain.   ABDOMEN: No abdominal pain, constipation, diarrhea, nausea, vomiting or rectal bleeding.   URINARY: No dysuria, hematuria, or burning on urination.  REPRODUCTIVE: See HPI.   BREASTS: Denies pain, lumps, or nipple discharge.   HEMATOLOGIC: No easy bruisability or excessive bleeding.   MUSCULOSKELETAL: Denies joint pain or swelling.   NEUROLOGIC: Denies syncope or weakness.   PSYCHIATRIC: Denies depression, anxiety or mood swings.    PE: Female chaperone present for exam  APPEARANCE: Well nourished, well developed, White female in no acute distress.  NODES: no cervical, supraclavicular, or inguinal lymphadenopathy  BREASTS: Symmetrical, no skin changes or visible  lesions. No palpable masses, nipple discharge or adenopathy bilaterally.  ABDOMEN: Soft. No tenderness or masses. No distention. No hernias palpated.   VULVA: No lesions. Normal external female genitalia.  URETHRAL MEATUS: Normal size and location, no lesions, no prolapse.  URETHRA: No masses, tenderness, or prolapse.  VAGINA: Moist. No lesions or lacerations noted. No abnormal discharge present. No odor present.   CERVIX: No lesions or discharge. No cervical motion tenderness.   UTERUS: Normal size, regular shape, mobile, non-tender.  ADNEXA: No tenderness. No fullness or masses palpated in the adnexal regions.   ANUS/PERINEUM: Normal.    Diagnosis:  1. Well woman exam with routine gynecological exam    2. Cervical cancer screening      Plan:   Orders Placed This Encounter    HPV High Risk Genotypes, PCR    Liquid-Based Pap Smear, Screening     - Pap smear and HPV co-test collected.      Patient was counseled today on the new ACS guidelines for cervical cytology screening as well as the current recommendations for breast cancer screening. She was counseled to follow up with her PCP for other routine health maintenance. Counseling session lasted approximately 10 minutes, and all her questions were answered.  For women over the age of 65, you can stop having cervical cancer screenings if you have never had abnormal cervical cells or cervical cancer, and youve had three negative Pap tests in a row. (You also can stop screening if youve had two negative Pap and HPV tests in a row in the past 10 years, with at least one test in the past 5 years.).    Follow-up with me in 1 year for routine well woman exam; pap in 3 years.

## 2025-01-29 ENCOUNTER — HOSPITAL ENCOUNTER (OUTPATIENT)
Dept: RADIOLOGY | Facility: HOSPITAL | Age: 65
Discharge: HOME | End: 2025-01-29
Attending: FAMILY MEDICINE
Payer: COMMERCIAL

## 2025-01-29 DIAGNOSIS — Z12.31 ENCOUNTER FOR SCREENING MAMMOGRAM FOR BREAST CANCER: ICD-10-CM

## 2025-01-29 PROCEDURE — 77067 SCR MAMMO BI INCL CAD: CPT | Mod: 26,,, | Performed by: RADIOLOGY

## 2025-01-29 PROCEDURE — 77063 BREAST TOMOSYNTHESIS BI: CPT | Mod: TC

## 2025-01-29 PROCEDURE — 77063 BREAST TOMOSYNTHESIS BI: CPT | Mod: 26,,, | Performed by: RADIOLOGY

## 2025-01-30 LAB
FINAL PATHOLOGIC DIAGNOSIS: NORMAL
Lab: NORMAL

## 2025-02-07 ENCOUNTER — PATIENT MESSAGE (OUTPATIENT)
Dept: OBSTETRICS AND GYNECOLOGY | Facility: CLINIC | Age: 65
End: 2025-02-07
Payer: COMMERCIAL

## 2025-02-07 ENCOUNTER — TELEPHONE (OUTPATIENT)
Dept: OBSTETRICS AND GYNECOLOGY | Facility: CLINIC | Age: 65
End: 2025-02-07
Payer: COMMERCIAL

## 2025-02-07 DIAGNOSIS — R87.810 CERVICAL HIGH RISK HPV (HUMAN PAPILLOMAVIRUS) TEST POSITIVE: Primary | ICD-10-CM

## 2025-02-07 NOTE — TELEPHONE ENCOUNTER
Spoke with pt. Pt notified of results. Colpo scheduled. All questions answered. Pt verbalized understanding.

## 2025-02-07 NOTE — TELEPHONE ENCOUNTER
HPV 16 + with NILM. ASCCP recommending colposcopy (see below).   Will have patient scheduled with OB/GYN for colpo.         Macy Cope, NP-C [facility]

## 2025-04-16 ENCOUNTER — PROCEDURE VISIT (OUTPATIENT)
Dept: OBSTETRICS AND GYNECOLOGY | Facility: CLINIC | Age: 65
End: 2025-04-16
Payer: COMMERCIAL

## 2025-04-16 VITALS
HEIGHT: 60 IN | SYSTOLIC BLOOD PRESSURE: 133 MMHG | WEIGHT: 132.69 LBS | DIASTOLIC BLOOD PRESSURE: 75 MMHG | BODY MASS INDEX: 26.05 KG/M2

## 2025-04-16 DIAGNOSIS — R87.810 HUMAN PAPILLOMAVIRUS (HPV) TYPE 16 DNA DETECTED IN CERVICAL SPECIMEN: Primary | ICD-10-CM

## 2025-04-16 PROCEDURE — 57454 BX/CURETT OF CERVIX W/SCOPE: CPT | Mod: S$GLB,,, | Performed by: OBSTETRICS & GYNECOLOGY

## 2025-04-16 NOTE — PROCEDURES
Colposcopy    Date/Time: 4/16/2025 9:45 AM    Performed by: Dori Colón MD  Authorized by: Dori Colón MD    Consent obatined:  Prior to procedure the appropriate consent was completed and verified    Colposcopy Site:  Cervix  Acrowhite Lesion: No    Atypical Vessels: No    Transformation Zone Adequate?: Yes    Biopsy?: Yes         Location:  Cervix ((6 00))  ECC Performed?: Yes (Diffuclt to perform due to stenotic cervix)    Estimated blood loss (cc):  1   Patient tolerated the procedure well with no immediate complications.   Post-operative instructions were provided for the patient.   Patient was discharged and will follow up if any complications occur

## 2025-04-22 NOTE — TELEPHONE ENCOUNTER
No care due was identified.  [facility] Embedded Care Due Messages. Reference number: [id number].   4/22/2025 2:32:01 PM CDT

## 2025-04-23 ENCOUNTER — RESULTS FOLLOW-UP (OUTPATIENT)
Dept: OBSTETRICS AND GYNECOLOGY | Facility: CLINIC | Age: 65
End: 2025-04-23

## 2025-04-23 RX ORDER — SERTRALINE HYDROCHLORIDE 25 MG/1
25 TABLET, FILM COATED ORAL DAILY
Qty: 90 TABLET | Refills: 0 | Status: SHIPPED | OUTPATIENT
Start: 2025-04-23 | End: 2026-04-23

## 2025-04-23 NOTE — TELEPHONE ENCOUNTER
Provider Staff:  Action required for this patient    Requires appointment      Please see care gap opportunities below in Care Due Message.    Thanks!  [facility]     Appointments      Date Provider   Last Visit   5/6/2024 Nathaniel Espinal MD   Next Visit   Visit date not found Nathaniel Espinal MD     Refill Decision Note   Annamarie Jiménez  is requesting a refill authorization.  Brief Assessment and Rationale for Refill:  Approve     Medication Therapy Plan:  01/23/25 EDv docs reviewed. No change to pended med      Comments:     Note composed:5:55 AM 04/23/2025

## 2025-08-09 RX ORDER — SERTRALINE HYDROCHLORIDE 25 MG/1
25 TABLET, FILM COATED ORAL DAILY
Qty: 90 TABLET | Refills: 0 | Status: SHIPPED | OUTPATIENT
Start: 2025-08-09 | End: 2026-08-09

## 2025-08-09 NOTE — TELEPHONE ENCOUNTER
Refill Routing Note   Medication(s) are not appropriate for processing by [facility] for the following reason(s):        Patient not seen by provider within 15 months    [facility] action(s):  Defer               Appointments  past 12m or future 3m with PCP    Date Provider   Last Visit   5/6/2024 Nathaniel Espinal MD   Next Visit   Visit date not found Nathaniel Espinal MD   ED visits in past 90 days: 0        Note composed:8:22 PM 08/08/2025

## 2025-08-11 ENCOUNTER — OFFICE VISIT (OUTPATIENT)
Dept: PRIMARY CARE CLINIC | Facility: CLINIC | Age: 65
End: 2025-08-11
Payer: COMMERCIAL

## 2025-08-11 VITALS
RESPIRATION RATE: 16 BRPM | WEIGHT: 136 LBS | TEMPERATURE: 98 F | DIASTOLIC BLOOD PRESSURE: 66 MMHG | HEIGHT: 60 IN | OXYGEN SATURATION: 98 % | SYSTOLIC BLOOD PRESSURE: 130 MMHG | BODY MASS INDEX: 26.7 KG/M2 | HEART RATE: 65 BPM

## 2025-08-11 DIAGNOSIS — Z12.11 ENCOUNTER FOR SCREENING FOR MALIGNANT NEOPLASM OF COLON: ICD-10-CM

## 2025-08-11 DIAGNOSIS — F41.9 ANXIETY: ICD-10-CM

## 2025-08-11 DIAGNOSIS — J45.20 MILD INTERMITTENT ASTHMA WITHOUT COMPLICATION: Primary | ICD-10-CM

## 2025-08-11 DIAGNOSIS — J18.9 COMMUNITY ACQUIRED PNEUMONIA, UNSPECIFIED LATERALITY: ICD-10-CM

## 2025-08-11 DIAGNOSIS — F32.0 CURRENT MILD EPISODE OF MAJOR DEPRESSIVE DISORDER WITHOUT PRIOR EPISODE: ICD-10-CM

## 2025-08-11 DIAGNOSIS — Q39.0 ESOPHAGEAL ATRESIA: ICD-10-CM

## 2025-08-11 DIAGNOSIS — J40 BRONCHITIS: ICD-10-CM

## 2025-08-11 PROCEDURE — 99999 PR PBB SHADOW E&M-EST. PATIENT-LVL III: CPT | Mod: PBBFAC,,, | Performed by: FAMILY MEDICINE

## 2025-08-11 RX ORDER — SERTRALINE HYDROCHLORIDE 25 MG/1
25 TABLET, FILM COATED ORAL DAILY
Qty: 90 TABLET | Refills: 3 | Status: SHIPPED | OUTPATIENT
Start: 2025-08-11 | End: 2026-08-11

## 2025-08-11 RX ORDER — ALBUTEROL SULFATE 0.83 MG/ML
2.5 SOLUTION RESPIRATORY (INHALATION) EVERY 6 HOURS PRN
Qty: 30 EACH | Refills: 3 | Status: SHIPPED | OUTPATIENT
Start: 2025-08-11

## 2025-08-12 ENCOUNTER — PATIENT MESSAGE (OUTPATIENT)
Dept: PRIMARY CARE CLINIC | Facility: CLINIC | Age: 65
End: 2025-08-12
Payer: COMMERCIAL

## 2025-09-02 ENCOUNTER — RESULTS FOLLOW-UP (OUTPATIENT)
Dept: PRIMARY CARE CLINIC | Facility: CLINIC | Age: 65
End: 2025-09-02
Payer: COMMERCIAL